# Patient Record
Sex: MALE | Race: BLACK OR AFRICAN AMERICAN | NOT HISPANIC OR LATINO | URBAN - METROPOLITAN AREA
[De-identification: names, ages, dates, MRNs, and addresses within clinical notes are randomized per-mention and may not be internally consistent; named-entity substitution may affect disease eponyms.]

---

## 2024-01-15 ENCOUNTER — EMERGENCY (EMERGENCY)
Facility: HOSPITAL | Age: 34
LOS: 0 days | Discharge: HOME | End: 2024-01-15
Attending: STUDENT IN AN ORGANIZED HEALTH CARE EDUCATION/TRAINING PROGRAM
Payer: MEDICAID

## 2024-01-15 VITALS
DIASTOLIC BLOOD PRESSURE: 68 MMHG | TEMPERATURE: 97 F | HEART RATE: 66 BPM | RESPIRATION RATE: 18 BRPM | OXYGEN SATURATION: 100 % | SYSTOLIC BLOOD PRESSURE: 116 MMHG

## 2024-01-15 VITALS
WEIGHT: 210.98 LBS | HEIGHT: 70 IN | OXYGEN SATURATION: 99 % | SYSTOLIC BLOOD PRESSURE: 113 MMHG | HEART RATE: 75 BPM | RESPIRATION RATE: 17 BRPM | TEMPERATURE: 98 F | DIASTOLIC BLOOD PRESSURE: 76 MMHG

## 2024-01-15 DIAGNOSIS — Z04.89 ENCOUNTER FOR EXAMINATION AND OBSERVATION FOR OTHER SPECIFIED REASONS: ICD-10-CM

## 2024-01-15 PROCEDURE — 99283 EMERGENCY DEPT VISIT LOW MDM: CPT

## 2024-01-15 NOTE — ED PROVIDER NOTE - CLINICAL SUMMARY MEDICAL DECISION MAKING FREE TEXT BOX
34 year old male with no past medical history presents today john, shawna states that he was at the airport [facility], had a flight to go back home to [place] which was cancelled, keli made arrangements for him to go to a hotel, however pt went to the wrong hotel, he called [employer] to inform, he was told to take a txi to the correct hotel, however pt was unable to, states that his credit card is not working, he called ems who brought him to the ER, pt does have an addrress but needs assistance to the [facility] hotel, pt otherwise  has no complaints, exam is benign, social work to be called this morning to help arrange transport to his hotel 34 year old male with no past medical history presents today john, shawna states that he was at the airport [facility], had a flight to go back home to [place] which was cancelled, keli made arrangements for him to go to a hotel, however pt went to the wrong hotel, he called [employer] to inform, he was told to take a txi to the correct hotel, however pt was unable to, states that his credit card is not working, he called ems who brought him to the ER, pt does have an addrress but needs assistance to the [facility] hotel, pt otherwise  has no complaints, exam is benign, social work to be called this morning to help arrange transport to his hotel 34 year old male with no past medical history presents today john, shawna states that he was at the airport [facility], had a flight to go back home to [place] which was cancelled, keli made arrangements for him to go to a hotel, however pt went to the wrong hotel, he called [employer] to inform, he was told to take a txi to the correct hotel, however pt was unable to, states that his credit card is not working, he called ems who brought him to the ER, pt does have an addrress but needs assistance to the [facility] hotel, pt otherwise  has no complaints, exam is benign, social work to be called this morning to help arrange transport to his hotel 34 year old male with no past medical history presents today john, shawna states that he was at the airport [facility], had a flight to go back home to [place] which was cancelled, keli made arrangements for him to go to a hotel, however pt went to the wrong hotel, he called [employer] to inform, he was told to take a txi to the correct hotel, however pt was unable to, states that his credit card is not working, he called ems who brought him to the ER, pt does have an addrress but needs assistance to the [facility] hotel, pt otherwise  has no complaints, exam is benign, social work to be called this morning to help arrange transport to his hotel, cait called, transport arranged 34 year old male with no past medical history presents today john, shawna states that he was at the airport [facility], had a flight to go back home to [place] which was cancelled, keil made arrangements for him to go to a hotel, however pt went to the wrong hotel, he called [employer] to inform, he was told to take a txi to the correct hotel, however pt was unable to, states that his credit card is not working, he called ems who brought him to the ER, pt does have an addrress but needs assistance to the [facility] hotel, pt otherwise  has no complaints, exam is benign, social work to be called this morning to help arrange transport to his hotel, cait called, transport arranged 34 year old male with no past medical history presents today john, shawna states that he was at the airport [facility], had a flight to go back home to [place] which was cancelled, keli made arrangements for him to go to a hotel, however pt went to the wrong hotel, he called [employer] to inform, he was told to take a txi to the correct hotel, however pt was unable to, states that his credit card is not working, he called ems who brought him to the ER, pt does have an addrress but needs assistance to the [facility] hotel, pt otherwise  has no complaints, exam is benign, social work to be called this morning to help arrange transport to his hotel, cait called, transport arranged

## 2024-01-15 NOTE — ED ADULT TRIAGE NOTE - CHIEF COMPLAINT QUOTE
BIBEMS from [facility]. As per EMS pt was brought at the wrong hotel ([employer]) Pt flight cancelled to [place]. next flight Tuesday 11am. No pmhx. NKDA.  [id number] BIBEMS from [facility]. As per EMS pt was brought at the wrong hotel ([employer]) Pt flight cancelled to [place]. next flight Tuesday 11am. No pmhx. NKDA.  [id number] BIBEMS from [facility]. As per EMS pt was brought at the wrong hotel ([employer]) Pt flight cancelled to [place]. next flight Tuesday 11am. No pmhx. NKDA.  [id number]

## 2024-01-15 NOTE — ED ADULT NURSE NOTE - OBJECTIVE STATEMENT
BIBEMS from [facility]. As per EMS pt was brought at the wrong hotel ([employer]) Pt flight cancelled to [place]. next flight Tuesday 11am. No pmhx. NKDA.  [id number] BIBEMS from [facility]. As per EMS pt was brought at the wrong hotel ([employer]) Pt flight cancelled to [place]. next flight Tuesday 11am. No pmhx. NKDA.  [id number] BIBEMS from [facility]. As per EMS pt was brought at the wrong hotel ([employer]) Pt flight cancelled to [place]. next flight Tuesday 11am. No pmhx. NKDA.  [id number] [facility]S [facility]. As per pt was brought to the wrong hotel after his [employer] flight got cancelled ([employer]). Pt flight was to [place]. next flight Tuesday 11am. No pmhx. NKDA.  kaylin [id number]. Pt states he called [employer] and they told him to take a taxi to the correct hotel but that pt would have to pay for the taxi fare. Pt states has no money and it was cold outside of the hotel so he called the ambulance who advised pt to come to the emergency room. pt denies any medical complaints at this time. Pt A&O4. Pt ambulatory with steady gait. [facility]S [facility]. As per pt was brought to the wrong hotel after his [employer] flight got cancelled ([employer]). Pt flight was to [place]. next flight Tuesday 11am. No pmhx. NKDA.  kaylin [id number]. Pt states he called [employer] and they told him to take a taxi to the correct hotel but that pt would have to pay for the taxi fare. Pt states has no money and it was cold outside of the hotel so he called the ambulance who advised pt to come to the emergency room. pt denies any medical complaints at this time. Pt A&O4. Pt ambulatory with steady gait. [facility]S [facility]. As per pt was brought to the wrong hotel after his [employer] flight got cancelled ([employer]). Pt flight was to [place]. next flight Tuesday 11am. No pmhx. NKDA.  kaylin [id number]. Pt states he called [employer] and they told him to take a taxi to the correct hotel but that pt would have to pay for the taxi fare. Pt states has no money and it was cold outside of the hotel so he called the ambulance who advised pt to come to the emergency room. pt denies any medical complaints at this time. Pt A&O4. Pt ambulatory with steady gait.

## 2024-01-15 NOTE — ED PROVIDER NOTE - IV ALTEPLASE EXCL REL HIDDEN
Cantharidin Pregnancy And Lactation Text: The use of this medication during pregnancy or lactation is not recommended as there is insufficient data. show

## 2024-01-15 NOTE — ED ADULT NURSE REASSESSMENT NOTE - NS ED NURSE REASSESS COMMENT FT1
Received report from nightshift RN, pt A/Ox3, pt resting comfortably in bed, even and unlabored respirations noted, no signs or symptoms of acute distress noted, pending SW consult.

## 2024-01-15 NOTE — ED PROVIDER NOTE - PATIENT PORTAL LINK FT
You can access the FollowMyHealth Patient Portal offered by [facility] by registering at the following website: [email or web address]. By joining [employer]’s FollowMyHealth portal, you will also be able to view your health information using other applications (apps) compatible with our system. You can access the FollowMyHealth Patient Portal offered by [facility] by registering at the following website: [email or web address]. By joining [employer]’s FollowMyHealth portal, you will also be able to view your health information using other applications (apps) compatible with our system. You can access the FollowMyHealth Patient Portal offered by [facility] by registering at the following website: [email or web address]. By joining [email or web address]’s FollowMyHealth portal, you will also be able to view your health information using other applications (apps) compatible with our system.

## 2024-01-15 NOTE — ED ADULT NURSE NOTE - CHIEF COMPLAINT QUOTE
BIBEMS from [facility]. As per EMS pt was brought at the wrong hotel ([employer]) Pt flight cancelled to [place]. next flight Tuesday 11am. No pmhx. NKDA.  [id number] BIBEMS from [facility]. As per EMS pt was brought at the wrong hotel ([employer]) Pt flight cancelled to [place]. next flight Tuesday 11am. No pmhx. NKDA.  [id number] BIBEMS from [facility]. As per EMS pt was brought at the wrong hotel ([employer]) Pt flight cancelled to [place]. next flight Tuesday 11am. No pmhx. NKDA.  [id number]

## 2024-01-15 NOTE — ED PROVIDER NOTE - OBJECTIVE STATEMENT
34 year old male with no past medical history presents today bibdario, shawna states that he was at the airport [facility], had a flight to go back home to [place] which was cancelled, keli made arrangements for him to go to a hotel, however pt went to the wrong hotel, he called [employer] to inform, he was told to take a txi to the correct hotel, however pt was unable to, states that his credit card is not working, he called ems who brought him to the ER, pt does have an addrress but needs assistance to the [facility] hotel, pt otherwise  has no complaints, exam is benign, social work to be called this morning to help arrange transport to his hotel    pt speaks Swahili   [id number] Dilcia used for assistance 34 year old male with no past medical history presents today bibdario, shawna states that he was at the airport [facility], had a flight to go back home to [place] which was cancelled, keli made arrangements for him to go to a hotel, however pt went to the wrong hotel, he called [employer] to inform, he was told to take a txi to the correct hotel, however pt was unable to, states that his credit card is not working, he called ems who brought him to the ER, pt does have an addrress but needs assistance to the [facility] hotel, pt otherwise  has no complaints, exam is benign, social work to be called this morning to help arrange transport to his hotel    pt speaks Swahili   [id number] Dilcia used for assistance 34 year old male with no past medical history presents today bibdario, shawna states that he was at the airport [facility], had a flight to go back home to [place] which was cancelled, keli made arrangements for him to go to a hotel, however pt went to the wrong hotel, he called [employer] to inform, he was told to take a txi to the correct hotel, however pt was unable to, states that his credit card is not working, he called ems who brought him to the ER, pt does have an addrress but needs assistance to the [facility] hotel, pt otherwise  has no complaints, exam is benign, social work to be called this morning to help arrange transport to his hotel    pt speaks Swahili   [id number] Dilcia used for assistance

## 2024-01-15 NOTE — ED ADULT NURSE NOTE - NSFALLUNIVINTERV_ED_ALL_ED
Bed/Stretcher in lowest position, wheels locked, appropriate side rails in place/Call bell, personal items and telephone in reach/Instruct patient to call for assistance before getting out of bed/chair/stretcher/Non-slip footwear applied when patient is off stretcher/[place] to call system/Physically safe environment - no spills, clutter or unnecessary equipment/Purposeful proactive rounding/Room/bathroom lighting operational, light cord in reach Bed/Stretcher in lowest position, wheels locked, appropriate side rails in place/Call bell, personal items and telephone in reach/Instruct patient to call for assistance before getting out of bed/chair/stretcher/Non-slip footwear applied when patient is off stretcher/[place] to call system/Physically safe environment - no spills, clutter or unnecessary equipment/Purposeful proactive rounding/Room/bathroom lighting operational, light cord in reach Bed/Stretcher in lowest position, wheels locked, appropriate side rails in place/Call bell, personal items and telephone in reach/Instruct patient to call for assistance before getting out of bed/chair/stretcher/Non-slip footwear applied when patient is off stretcher/[place] to call system/Physically safe environment - no spills, clutter or unnecessary equipment/Purposeful proactive rounding/Room/bathroom lighting operational, light cord in reach